# Patient Record
Sex: FEMALE | Race: WHITE | Employment: STUDENT | URBAN - METROPOLITAN AREA
[De-identification: names, ages, dates, MRNs, and addresses within clinical notes are randomized per-mention and may not be internally consistent; named-entity substitution may affect disease eponyms.]

---
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
## 2024-03-09 ENCOUNTER — LAB ENCOUNTER (OUTPATIENT)
Dept: LAB | Age: 19
End: 2024-03-09
Attending: INTERNAL MEDICINE
Payer: COMMERCIAL

## 2024-03-09 DIAGNOSIS — R00.2 PALPITATIONS: ICD-10-CM

## 2024-03-09 DIAGNOSIS — R71.8 ELEVATED RED BLOOD CELL COUNT: ICD-10-CM

## 2024-03-09 DIAGNOSIS — R07.9 CHEST PAIN, UNSPECIFIED: Primary | ICD-10-CM

## 2024-03-09 LAB
ALBUMIN SERPL-MCNC: 4.3 G/DL (ref 3.4–5)
ALBUMIN/GLOB SERPL: 1.3 {RATIO} (ref 1–2)
ALP LIVER SERPL-CCNC: 53 U/L
ALT SERPL-CCNC: 19 U/L
ANION GAP SERPL CALC-SCNC: 6 MMOL/L (ref 0–18)
AST SERPL-CCNC: 17 U/L (ref 15–37)
BASOPHILS # BLD AUTO: 0.12 X10(3) UL (ref 0–0.2)
BASOPHILS NFR BLD AUTO: 1.4 %
BILIRUB SERPL-MCNC: 0.6 MG/DL (ref 0.1–2)
BUN BLD-MCNC: 14 MG/DL (ref 9–23)
CALCIUM BLD-MCNC: 9.6 MG/DL (ref 8.5–10.1)
CHLORIDE SERPL-SCNC: 106 MMOL/L (ref 98–112)
CHOLEST SERPL-MCNC: 165 MG/DL (ref ?–200)
CO2 SERPL-SCNC: 28 MMOL/L (ref 21–32)
CREAT BLD-MCNC: 0.86 MG/DL
CRP SERPL HS-MCNC: <0.16 MG/L (ref ?–3)
EGFRCR SERPLBLD CKD-EPI 2021: 100 ML/MIN/1.73M2 (ref 60–?)
EOSINOPHIL # BLD AUTO: 0.48 X10(3) UL (ref 0–0.7)
EOSINOPHIL NFR BLD AUTO: 5.8 %
ERYTHROCYTE [DISTWIDTH] IN BLOOD BY AUTOMATED COUNT: 12.7 %
ERYTHROCYTE [SEDIMENTATION RATE] IN BLOOD: 3 MM/HR
FASTING PATIENT LIPID ANSWER: YES
FASTING STATUS PATIENT QL REPORTED: YES
GLOBULIN PLAS-MCNC: 3.3 G/DL (ref 2.8–4.4)
GLUCOSE BLD-MCNC: 83 MG/DL (ref 70–99)
HCT VFR BLD AUTO: 44.6 %
HDLC SERPL-MCNC: 96 MG/DL (ref 40–59)
HGB BLD-MCNC: 14.9 G/DL
IMM GRANULOCYTES # BLD AUTO: 0.01 X10(3) UL (ref 0–1)
IMM GRANULOCYTES NFR BLD: 0.1 %
LDLC SERPL CALC-MCNC: 59 MG/DL (ref ?–100)
LYMPHOCYTES # BLD AUTO: 1.86 X10(3) UL (ref 1.5–5)
LYMPHOCYTES NFR BLD AUTO: 22.5 %
MAGNESIUM SERPL-MCNC: 2.2 MG/DL (ref 1.6–2.6)
MCH RBC QN AUTO: 29 PG (ref 26–34)
MCHC RBC AUTO-ENTMCNC: 33.4 G/DL (ref 31–37)
MCV RBC AUTO: 86.9 FL
MONOCYTES # BLD AUTO: 0.71 X10(3) UL (ref 0.1–1)
MONOCYTES NFR BLD AUTO: 8.6 %
NEUTROPHILS # BLD AUTO: 5.1 X10 (3) UL (ref 1.5–7.7)
NEUTROPHILS # BLD AUTO: 5.1 X10(3) UL (ref 1.5–7.7)
NEUTROPHILS NFR BLD AUTO: 61.6 %
NONHDLC SERPL-MCNC: 69 MG/DL (ref ?–130)
OSMOLALITY SERPL CALC.SUM OF ELEC: 290 MOSM/KG (ref 275–295)
PLATELET # BLD AUTO: 266 10(3)UL (ref 150–450)
POTASSIUM SERPL-SCNC: 4 MMOL/L (ref 3.5–5.1)
PROT SERPL-MCNC: 7.6 G/DL (ref 6.4–8.2)
RBC # BLD AUTO: 5.13 X10(6)UL
SODIUM SERPL-SCNC: 140 MMOL/L (ref 136–145)
T4 FREE SERPL-MCNC: 1 NG/DL (ref 0.9–1.6)
TRIGL SERPL-MCNC: 46 MG/DL (ref 30–149)
TSI SER-ACNC: 0.73 MIU/ML (ref 0.36–3.74)
VIT D+METAB SERPL-MCNC: 27.1 NG/ML (ref 30–100)
VLDLC SERPL CALC-MCNC: 7 MG/DL (ref 0–30)
WBC # BLD AUTO: 8.3 X10(3) UL (ref 4–11)

## 2024-03-09 PROCEDURE — 83735 ASSAY OF MAGNESIUM: CPT

## 2024-03-09 PROCEDURE — 84439 ASSAY OF FREE THYROXINE: CPT

## 2024-03-09 PROCEDURE — 85025 COMPLETE CBC W/AUTO DIFF WBC: CPT

## 2024-03-09 PROCEDURE — 36415 COLL VENOUS BLD VENIPUNCTURE: CPT

## 2024-03-09 PROCEDURE — 85652 RBC SED RATE AUTOMATED: CPT

## 2024-03-09 PROCEDURE — 86141 C-REACTIVE PROTEIN HS: CPT

## 2024-03-09 PROCEDURE — 82306 VITAMIN D 25 HYDROXY: CPT

## 2024-03-09 PROCEDURE — 80061 LIPID PANEL: CPT

## 2024-03-09 PROCEDURE — 84443 ASSAY THYROID STIM HORMONE: CPT

## 2024-03-09 PROCEDURE — 80053 COMPREHEN METABOLIC PANEL: CPT

## 2024-04-29 ENCOUNTER — TELEPHONE (OUTPATIENT)
Dept: FAMILY MEDICINE CLINIC | Facility: CLINIC | Age: 19
End: 2024-04-29

## 2024-04-29 ENCOUNTER — OFFICE VISIT (OUTPATIENT)
Dept: FAMILY MEDICINE CLINIC | Facility: CLINIC | Age: 19
End: 2024-04-29
Payer: COMMERCIAL

## 2024-04-29 ENCOUNTER — HOSPITAL ENCOUNTER (OUTPATIENT)
Dept: CV DIAGNOSTICS | Facility: HOSPITAL | Age: 19
End: 2024-04-29
Attending: INTERNAL MEDICINE
Payer: COMMERCIAL

## 2024-04-29 ENCOUNTER — LAB ENCOUNTER (OUTPATIENT)
Dept: LAB | Age: 19
End: 2024-04-29
Attending: NURSE PRACTITIONER
Payer: COMMERCIAL

## 2024-04-29 ENCOUNTER — HOSPITAL ENCOUNTER (OUTPATIENT)
Dept: CV DIAGNOSTICS | Facility: HOSPITAL | Age: 19
Discharge: HOME | End: 2024-04-29
Attending: INTERNAL MEDICINE
Payer: COMMERCIAL

## 2024-04-29 VITALS
DIASTOLIC BLOOD PRESSURE: 64 MMHG | WEIGHT: 146.81 LBS | BODY MASS INDEX: 21.26 KG/M2 | SYSTOLIC BLOOD PRESSURE: 102 MMHG | OXYGEN SATURATION: 98 % | HEART RATE: 64 BPM | RESPIRATION RATE: 18 BRPM | TEMPERATURE: 98 F | HEIGHT: 69.5 IN

## 2024-04-29 DIAGNOSIS — R00.1 SINUS BRADYCARDIA: ICD-10-CM

## 2024-04-29 DIAGNOSIS — N92.6 IRREGULAR MENSES: ICD-10-CM

## 2024-04-29 DIAGNOSIS — R00.2 PALPITATIONS: ICD-10-CM

## 2024-04-29 DIAGNOSIS — R07.89 ATYPICAL CHEST PAIN: ICD-10-CM

## 2024-04-29 DIAGNOSIS — R53.83 OTHER FATIGUE: ICD-10-CM

## 2024-04-29 DIAGNOSIS — Z00.00 GENERAL MEDICAL EXAM: Primary | ICD-10-CM

## 2024-04-29 DIAGNOSIS — Z00.00 GENERAL MEDICAL EXAM: ICD-10-CM

## 2024-04-29 DIAGNOSIS — K30 INDIGESTION: ICD-10-CM

## 2024-04-29 DIAGNOSIS — R07.9 CHEST PAIN, UNSPECIFIED: ICD-10-CM

## 2024-04-29 PROBLEM — M84.362A STRESS FRACTURE OF LEFT TIBIA: Status: RESOLVED | Noted: 2022-03-09 | Resolved: 2024-04-29

## 2024-04-29 LAB
ALBUMIN SERPL-MCNC: 4.2 G/DL (ref 3.4–5)
ALBUMIN/GLOB SERPL: 1.2 {RATIO} (ref 1–2)
ALP LIVER SERPL-CCNC: 58 U/L
ALT SERPL-CCNC: 22 U/L
ANION GAP SERPL CALC-SCNC: 3 MMOL/L (ref 0–18)
AST SERPL-CCNC: 18 U/L (ref 15–37)
BASOPHILS # BLD AUTO: 0.13 X10(3) UL (ref 0–0.2)
BASOPHILS NFR BLD AUTO: 1.8 %
BILIRUB SERPL-MCNC: 0.6 MG/DL (ref 0.1–2)
BUN BLD-MCNC: 13 MG/DL (ref 9–23)
CALCIUM BLD-MCNC: 9.7 MG/DL (ref 8.5–10.1)
CHLORIDE SERPL-SCNC: 106 MMOL/L (ref 98–112)
CHOLEST SERPL-MCNC: 162 MG/DL (ref ?–200)
CO2 SERPL-SCNC: 29 MMOL/L (ref 21–32)
CREAT BLD-MCNC: 1.02 MG/DL
EGFRCR SERPLBLD CKD-EPI 2021: 81 ML/MIN/1.73M2 (ref 60–?)
EOSINOPHIL # BLD AUTO: 0.68 X10(3) UL (ref 0–0.7)
EOSINOPHIL NFR BLD AUTO: 9.3 %
ERYTHROCYTE [DISTWIDTH] IN BLOOD BY AUTOMATED COUNT: 12.5 %
FASTING PATIENT LIPID ANSWER: NO
FASTING STATUS PATIENT QL REPORTED: NO
GLOBULIN PLAS-MCNC: 3.6 G/DL (ref 2.8–4.4)
GLUCOSE BLD-MCNC: 85 MG/DL (ref 70–99)
HCT VFR BLD AUTO: 43.7 %
HDLC SERPL-MCNC: 95 MG/DL (ref 40–59)
HGB BLD-MCNC: 15 G/DL
IMM GRANULOCYTES # BLD AUTO: 0.01 X10(3) UL (ref 0–1)
IMM GRANULOCYTES NFR BLD: 0.1 %
LDLC SERPL CALC-MCNC: 52 MG/DL (ref ?–100)
LYMPHOCYTES # BLD AUTO: 1.73 X10(3) UL (ref 1.5–5)
LYMPHOCYTES NFR BLD AUTO: 23.7 %
MCH RBC QN AUTO: 28.8 PG (ref 26–34)
MCHC RBC AUTO-ENTMCNC: 34.3 G/DL (ref 31–37)
MCV RBC AUTO: 84 FL
MONOCYTES # BLD AUTO: 0.73 X10(3) UL (ref 0.1–1)
MONOCYTES NFR BLD AUTO: 10 %
NEUTROPHILS # BLD AUTO: 4.02 X10 (3) UL (ref 1.5–7.7)
NEUTROPHILS # BLD AUTO: 4.02 X10(3) UL (ref 1.5–7.7)
NEUTROPHILS NFR BLD AUTO: 55.1 %
NONHDLC SERPL-MCNC: 67 MG/DL (ref ?–130)
OSMOLALITY SERPL CALC.SUM OF ELEC: 285 MOSM/KG (ref 275–295)
PLATELET # BLD AUTO: 313 10(3)UL (ref 150–450)
POTASSIUM SERPL-SCNC: 4.2 MMOL/L (ref 3.5–5.1)
PROT SERPL-MCNC: 7.8 G/DL (ref 6.4–8.2)
RBC # BLD AUTO: 5.2 X10(6)UL
SODIUM SERPL-SCNC: 138 MMOL/L (ref 136–145)
TRIGL SERPL-MCNC: 82 MG/DL (ref 30–149)
TSI SER-ACNC: 1.12 MIU/ML (ref 0.36–3.74)
VIT D+METAB SERPL-MCNC: 21.5 NG/ML (ref 30–100)
VLDLC SERPL CALC-MCNC: 12 MG/DL (ref 0–30)
WBC # BLD AUTO: 7.3 X10(3) UL (ref 4–11)

## 2024-04-29 PROCEDURE — 80061 LIPID PANEL: CPT | Performed by: NURSE PRACTITIONER

## 2024-04-29 PROCEDURE — 80050 GENERAL HEALTH PANEL: CPT | Performed by: NURSE PRACTITIONER

## 2024-04-29 PROCEDURE — 93306 TTE W/DOPPLER COMPLETE: CPT | Performed by: INTERNAL MEDICINE

## 2024-04-29 PROCEDURE — 82306 VITAMIN D 25 HYDROXY: CPT | Performed by: NURSE PRACTITIONER

## 2024-04-29 RX ORDER — METOPROLOL SUCCINATE 25 MG/1
TABLET, EXTENDED RELEASE ORAL
COMMUNITY
Start: 2023-03-09 | End: 2024-04-29

## 2024-04-29 NOTE — PROGRESS NOTES
HPI:   Patient is here for physical.    Concerns:   Periods are irregular  Indigestion for a couple of weeks. Happens every times she eats. Has not started food diary. Tried Tums but no relief    LMP: 4/24/24  Menstrual Cycle Length: irregular in the past (3 periods in October, then skipped in November), normal periods  PMS: none  Contraception not sexually active. Declines STD screening    Exercise: running, some strength training  Diet: could be better, especially at college    Wt Readings from Last 6 Encounters:   04/29/24 146 lb 12.8 oz (66.6 kg) (77%, Z= 0.74)*   03/09/23 147 lb (66.7 kg) (80%, Z= 0.86)*   12/20/21 140 lb (63.5 kg) (77%, Z= 0.74)*   11/28/19 134 lb (60.8 kg) (77%, Z= 0.73)*     * Growth percentiles are based on CDC (Girls, 2-20 Years) data.     Body mass index is 21.37 kg/m².     Cholesterol, Total (mg/dL)   Date Value   03/09/2024 165     HDL Cholesterol (mg/dL)   Date Value   03/09/2024 96 (H)     LDL Cholesterol (mg/dL)   Date Value   03/09/2024 59     AST (U/L)   Date Value   03/09/2024 17   03/09/2023 33     ALT (U/L)   Date Value   03/09/2024 19   03/09/2023 28        Current Outpatient Medications   Medication Sig Dispense Refill    Multiple Vitamin (MULTI-VITAMIN DAILY) Oral Tab Take by mouth daily.      MAGNESIUM SULFATE OR daily.        History reviewed. No pertinent past medical history.   History reviewed. No pertinent surgical history.   History reviewed. No pertinent family history.   Social History:   Social History     Socioeconomic History    Marital status: Single   Tobacco Use    Smoking status: Never    Smokeless tobacco: Never   Vaping Use    Vaping status: Never Used     Social Determinants of Health      [facility]    Housing Stability        REVIEW OF SYSTEMS:   Review of Systems   Constitutional:  Positive for fatigue. Negative for chills, fever and unexpected weight change.   HENT:  Negative for  congestion, ear pain, postnasal drip, rhinorrhea, sore throat and trouble swallowing.    Eyes:  Negative for visual disturbance.   Respiratory:  Negative for cough and shortness of breath.    Cardiovascular:  Positive for chest pain (seeing cardiology, having stress test todya). Negative for palpitations.   Gastrointestinal:  Negative for abdominal pain, blood in stool, constipation, diarrhea, nausea and vomiting.        + indigestion   Endocrine: Negative for cold intolerance, heat intolerance, polydipsia, polyphagia and polyuria.   Genitourinary:  Negative for dysuria, frequency, hematuria and pelvic pain.   Musculoskeletal:  Negative for back pain.   Skin:  Negative for rash.   Neurological:  Negative for headaches.   Hematological:  Does not bruise/bleed easily.   Psychiatric/Behavioral:  Negative for dysphoric mood and sleep disturbance. The patient is not nervous/anxious.        EXAM:   /64 (BP Location: Left arm, Patient Position: Sitting, Cuff Size: adult)   Pulse 64   Temp 97.6 °F (36.4 °C)   Resp 18   Ht 5' 9.5\" (1.765 m)   Wt 146 lb 12.8 oz (66.6 kg)   LMP 04/24/2024 (Exact Date)   SpO2 98%   BMI 21.37 kg/m²   Ideal body weight: 67.4 kg (148 lb 7.7 oz)   Physical Exam  Constitutional:       General: She is not in acute distress.     Appearance: Normal appearance. She is well-developed and well-groomed. She is not ill-appearing.   HENT:      Right Ear: Tympanic membrane, ear canal and external ear normal.      Left Ear: Tympanic membrane, ear canal and external ear normal.      Nose: Nose normal.      Mouth/Throat:      Mouth: Mucous membranes are moist.      Pharynx: Oropharynx is clear.   Eyes:      Conjunctiva/sclera: Conjunctivae normal.      Pupils: Pupils are equal, round, and reactive to light.   Neck:      Thyroid: No thyromegaly.   Cardiovascular:      Rate and Rhythm: Normal rate and regular rhythm.      Heart sounds: Normal heart sounds.   Pulmonary:      Effort: Pulmonary effort  is normal.      Breath sounds: Normal breath sounds.   Abdominal:      General: Abdomen is flat. Bowel sounds are normal.      Palpations: Abdomen is soft.      Tenderness: There is no abdominal tenderness.   Musculoskeletal:         General: Normal range of motion.      Cervical back: Normal range of motion.   Skin:     General: Skin is warm and dry.   Neurological:      General: No focal deficit present.      Mental Status: She is alert and oriented to person, place, and time.      Cranial Nerves: No cranial nerve deficit.   Psychiatric:         Mood and Affect: Mood normal.         ASSESSMENT AND PLAN:   Diagnoses and all orders for this visit:    General medical exam  -     TSH W Reflex To Free T4; Future  -     Comp Metabolic Panel (14); Future  -     Lipid Panel; Future  -     CBC With Differential With Platelet; Future    Indigestion    Irregular menses    Other fatigue  -     Vitamin D [E]; Future    Checking lab work  Seeing cardiology today  Discussed strategies to reduce indigestion. Trial OTC Nexium  Call with sx persisting or worsening

## 2025-03-03 ENCOUNTER — OFFICE VISIT (OUTPATIENT)
Dept: FAMILY MEDICINE CLINIC | Facility: CLINIC | Age: 20
End: 2025-03-03
Payer: COMMERCIAL

## 2025-03-03 VITALS
BODY MASS INDEX: 21.43 KG/M2 | HEIGHT: 69.5 IN | OXYGEN SATURATION: 98 % | SYSTOLIC BLOOD PRESSURE: 112 MMHG | WEIGHT: 148 LBS | TEMPERATURE: 98 F | HEART RATE: 60 BPM | DIASTOLIC BLOOD PRESSURE: 66 MMHG

## 2025-03-03 DIAGNOSIS — E55.9 VITAMIN D DEFICIENCY: ICD-10-CM

## 2025-03-03 DIAGNOSIS — N83.202 LEFT OVARIAN CYST: ICD-10-CM

## 2025-03-03 DIAGNOSIS — L70.0 ACNE VULGARIS: ICD-10-CM

## 2025-03-03 DIAGNOSIS — R53.83 OTHER FATIGUE: ICD-10-CM

## 2025-03-03 DIAGNOSIS — Z30.011 OCP (ORAL CONTRACEPTIVE PILLS) INITIATION: ICD-10-CM

## 2025-03-03 DIAGNOSIS — N92.6 IRREGULAR MENSES: Primary | ICD-10-CM

## 2025-03-03 PROCEDURE — 99213 OFFICE O/P EST LOW 20 MIN: CPT | Performed by: NURSE PRACTITIONER

## 2025-03-03 PROCEDURE — G2211 COMPLEX E/M VISIT ADD ON: HCPCS | Performed by: NURSE PRACTITIONER

## 2025-03-03 PROCEDURE — 3074F SYST BP LT 130 MM HG: CPT | Performed by: NURSE PRACTITIONER

## 2025-03-03 PROCEDURE — 3078F DIAST BP <80 MM HG: CPT | Performed by: NURSE PRACTITIONER

## 2025-03-03 PROCEDURE — 3008F BODY MASS INDEX DOCD: CPT | Performed by: NURSE PRACTITIONER

## 2025-03-03 RX ORDER — SPIRONOLACTONE 25 MG/1
25 TABLET ORAL DAILY
COMMUNITY

## 2025-03-03 RX ORDER — NORETHINDRONE ACETATE AND ETHINYL ESTRADIOL 1MG-20(21)
1 KIT ORAL DAILY
Qty: 84 TABLET | Refills: 0 | Status: SHIPPED | OUTPATIENT
Start: 2025-03-03 | End: 2025-05-26

## 2025-03-03 NOTE — PROGRESS NOTES
HPI:     Patient is here for ER follow up for ovaian cyst. Pain is a little better and only comes and goes versus before where sharp pain was there the entire week and a half.     Periods have always been irregular - sometimes gets period sometimes does not   LMP 2/18/2025 x6days abnomally heavy x2 days no clots   12/8/2024 was the period prior to that x6 days moderate to light flow no clots   Dermatologist also started her on Spironolactone for acne. Prior to starting that, felt like she was getting a monthly     Fatigue- no matter how much sleep she gets she wakes up tired x1 year     Current Outpatient Medications   Medication Sig Dispense Refill    Norethin Ace-Eth Estrad-FE 1-20 MG-MCG Oral Tab Take 1 tablet by mouth daily. 84 tablet 0    spironolactone 25 MG Oral Tab Take 1 tablet (25 mg total) by mouth daily.      MAGNESIUM SULFATE OR daily.        History reviewed. No pertinent past medical history.   History reviewed. No pertinent surgical history.   History reviewed. No pertinent family history.   Social History     Socioeconomic History    Marital status: Single   Tobacco Use    Smoking status: Never    Smokeless tobacco: Never   Vaping Use    Vaping status: Never Used     Social Drivers of Health      Received from [facility], [facility]    Housing Stability         REVIEW OF SYSTEMS:   Review of Systems   Constitutional:  Positive for fatigue.   Respiratory: Negative.     Cardiovascular: Negative.    Genitourinary:  Positive for menstrual problem.        Irregular periods      Musculoskeletal:         Left sided pain on and off         EXAM:   /66   Pulse 60   Temp 97.8 °F (36.6 °C)   Ht 5' 9.5\" (1.765 m)   Wt 148 lb (67.1 kg)   LMP 02/18/2025 (Exact Date)   SpO2 98%   BMI 21.54 kg/m²   Physical Exam  Constitutional:       Appearance: Normal appearance. She is normal weight.   Cardiovascular:      Rate and Rhythm: Normal rate and regular rhythm.       Pulses: Normal pulses.      Heart sounds: Normal heart sounds.   Pulmonary:      Effort: Pulmonary effort is normal.      Breath sounds: Normal breath sounds.   Neurological:      Mental Status: She is alert.         ASSESSMENT AND PLAN:   Diagnoses and all orders for this visit:    Irregular menses  Comments:  will start OCP and RTC in 3 months. Use period tracker  Orders:  -     Hemoglobin A1C [E]; Future  -     TSH and Free T4 [E]; Future  -     FSH; Future  -     LH (Luteinizing Hormone); Future  -     Norethin Ace-Eth Estrad-FE 1-20 MG-MCG Oral Tab; Take 1 tablet by mouth daily.    Left ovarian cyst  Comments:  pain is lessening, plan to recheck u/s in 3 months to assess for resolution  Orders:  -     US PELVIS (TRANSABDOMINAL AND TRANSVAGINAL) (CPT=76856/47916); Future    OCP (oral contraceptive pills) initiation  Comments:  Has never been sexually active, no fam hx of blood clots/bleeding disorders. Start OCP 4 weeks after LMP  Orders:  -     Norethin Ace-Eth Estrad-FE 1-20 MG-MCG Oral Tab; Take 1 tablet by mouth daily.    Other fatigue  Comments:  check additional lab work today  Orders:  -     Hemoglobin A1C [E]; Future  -     TSH and Free T4 [E]; Future    Vitamin D deficiency  -     Vitamin D [E]; Future    Acne vulgaris    Patient denies any family history of clotting disorders/blood clots.     Patient discussed with student. Independent exam performed. Agree with assessment and plan.

## 2025-04-02 ENCOUNTER — TELEPHONE (OUTPATIENT)
Dept: FAMILY MEDICINE CLINIC | Facility: CLINIC | Age: 20
End: 2025-04-02

## 2025-04-02 NOTE — TELEPHONE ENCOUNTER
Patient due for labs and US  MCM sent to patient  Future Appointments   Date Time Provider Department Center   5/5/2025  9:00 AM OS US RM1 [facility] [place]   5/5/2025 10:00 AM Anna Marie Horne APRN [facility] [place]

## 2025-05-05 ENCOUNTER — LAB ENCOUNTER (OUTPATIENT)
Dept: LAB | Age: 20
End: 2025-05-05
Attending: NURSE PRACTITIONER
Payer: COMMERCIAL

## 2025-05-05 ENCOUNTER — HOSPITAL ENCOUNTER (OUTPATIENT)
Dept: ULTRASOUND IMAGING | Age: 20
Discharge: HOME | End: 2025-05-05
Attending: NURSE PRACTITIONER
Payer: COMMERCIAL

## 2025-05-05 ENCOUNTER — OFFICE VISIT (OUTPATIENT)
Dept: FAMILY MEDICINE CLINIC | Facility: CLINIC | Age: 20
End: 2025-05-05
Payer: COMMERCIAL

## 2025-05-05 VITALS
HEIGHT: 69.5 IN | BODY MASS INDEX: 21.86 KG/M2 | OXYGEN SATURATION: 99 % | WEIGHT: 151 LBS | HEART RATE: 81 BPM | DIASTOLIC BLOOD PRESSURE: 68 MMHG | SYSTOLIC BLOOD PRESSURE: 112 MMHG | TEMPERATURE: 97 F

## 2025-05-05 DIAGNOSIS — E55.9 VITAMIN D DEFICIENCY: ICD-10-CM

## 2025-05-05 DIAGNOSIS — N83.202 LEFT OVARIAN CYST: ICD-10-CM

## 2025-05-05 DIAGNOSIS — R53.83 OTHER FATIGUE: ICD-10-CM

## 2025-05-05 DIAGNOSIS — Z00.00 GENERAL MEDICAL EXAM: Primary | ICD-10-CM

## 2025-05-05 DIAGNOSIS — K06.8 GUMS, BLEEDING: ICD-10-CM

## 2025-05-05 DIAGNOSIS — G47.09 OTHER INSOMNIA: ICD-10-CM

## 2025-05-05 DIAGNOSIS — N92.6 IRREGULAR MENSES: ICD-10-CM

## 2025-05-05 DIAGNOSIS — Z30.41 ORAL CONTRACEPTIVE PILL SURVEILLANCE: ICD-10-CM

## 2025-05-05 DIAGNOSIS — Z23 NEED FOR VACCINATION: ICD-10-CM

## 2025-05-05 LAB
ALBUMIN SERPL-MCNC: 5.1 G/DL (ref 3.2–4.8)
ALBUMIN/GLOB SERPL: 1.9 {RATIO} (ref 1–2)
ALP LIVER SERPL-CCNC: 44 U/L (ref 52–144)
ALT SERPL-CCNC: 18 U/L (ref 10–49)
ANION GAP SERPL CALC-SCNC: 7 MMOL/L (ref 0–18)
AST SERPL-CCNC: 27 U/L (ref ?–34)
BILIRUB SERPL-MCNC: 0.4 MG/DL (ref 0.3–1.2)
BUN BLD-MCNC: 18 MG/DL (ref 9–23)
CALCIUM BLD-MCNC: 9.8 MG/DL (ref 8.7–10.6)
CHLORIDE SERPL-SCNC: 103 MMOL/L (ref 98–112)
CO2 SERPL-SCNC: 28 MMOL/L (ref 21–32)
CREAT BLD-MCNC: 1.05 MG/DL (ref 0.55–1.02)
EGFRCR SERPLBLD CKD-EPI 2021: 78 ML/MIN/1.73M2 (ref 60–?)
EST. AVERAGE GLUCOSE BLD GHB EST-MCNC: 105 MG/DL (ref 68–126)
FASTING STATUS PATIENT QL REPORTED: NO
GLOBULIN PLAS-MCNC: 2.7 G/DL (ref 2–3.5)
GLUCOSE BLD-MCNC: 107 MG/DL (ref 70–99)
HBA1C MFR BLD: 5.3 % (ref ?–5.7)
OSMOLALITY SERPL CALC.SUM OF ELEC: 288 MOSM/KG (ref 275–295)
POTASSIUM SERPL-SCNC: 4.2 MMOL/L (ref 3.5–5.1)
PROT SERPL-MCNC: 7.8 G/DL (ref 5.7–8.2)
SODIUM SERPL-SCNC: 138 MMOL/L (ref 136–145)
T4 FREE SERPL-MCNC: 1.4 NG/DL (ref 0.8–1.7)
TSI SER-ACNC: 0.54 UIU/ML (ref 0.55–4.78)
VIT D+METAB SERPL-MCNC: 23.8 NG/ML (ref 30–100)

## 2025-05-05 PROCEDURE — 82785 ASSAY OF IGE: CPT | Performed by: NURSE PRACTITIONER

## 2025-05-05 PROCEDURE — 80053 COMPREHEN METABOLIC PANEL: CPT | Performed by: NURSE PRACTITIONER

## 2025-05-05 PROCEDURE — 84439 ASSAY OF FREE THYROXINE: CPT | Performed by: NURSE PRACTITIONER

## 2025-05-05 PROCEDURE — 76830 TRANSVAGINAL US NON-OB: CPT | Performed by: NURSE PRACTITIONER

## 2025-05-05 PROCEDURE — 84443 ASSAY THYROID STIM HORMONE: CPT | Performed by: NURSE PRACTITIONER

## 2025-05-05 PROCEDURE — 83036 HEMOGLOBIN GLYCOSYLATED A1C: CPT | Performed by: NURSE PRACTITIONER

## 2025-05-05 PROCEDURE — 82306 VITAMIN D 25 HYDROXY: CPT | Performed by: NURSE PRACTITIONER

## 2025-05-05 PROCEDURE — 76856 US EXAM PELVIC COMPLETE: CPT | Performed by: NURSE PRACTITIONER

## 2025-05-05 PROCEDURE — 86003 ALLG SPEC IGE CRUDE XTRC EA: CPT | Performed by: NURSE PRACTITIONER

## 2025-05-05 RX ORDER — NORETHINDRONE ACETATE AND ETHINYL ESTRADIOL 1MG-20(21)
1 KIT ORAL DAILY
Qty: 84 TABLET | Refills: 0 | Status: SHIPPED | OUTPATIENT
Start: 2025-05-05 | End: 2025-07-28

## 2025-05-05 RX ORDER — MINOCYCLINE 15 MG/G
AEROSOL, FOAM TOPICAL
COMMUNITY
Start: 2025-03-04

## 2025-05-05 RX ORDER — DOXYCYCLINE HYCLATE 50 MG/1
TABLET, FILM COATED ORAL
COMMUNITY
Start: 2025-04-03

## 2025-05-05 NOTE — PROGRESS NOTES
HPI:   Patient is here for physical.    Concerns: following up for ovarian cyst and starting OCP    LMP: 4/22/2025  Menstrual Cycle Length: irregular since starting oral contraceptive pill in 3/2025, flow is overall light  PMS: fatigue  Sexual Activity: not sexually active    Wt Readings from Last 6 Encounters:   05/05/25 151 lb (68.5 kg)   03/03/25 148 lb (67.1 kg)   04/29/24 146 lb 12.8 oz (66.6 kg) (77%, Z= 0.74)*   03/09/23 147 lb (66.7 kg) (80%, Z= 0.86)*   12/20/21 140 lb (63.5 kg) (77%, Z= 0.74)*   11/28/19 134 lb (60.8 kg) (77%, Z= 0.73)*     * Growth percentiles are based on CDC (Girls, 2-20 Years) data.     Body mass index is 21.98 kg/m².     Cholesterol, Total (mg/dL)   Date Value   04/29/2024 162   03/09/2024 165     HDL Cholesterol (mg/dL)   Date Value   04/29/2024 95 (H)   03/09/2024 96 (H)     LDL Cholesterol (mg/dL)   Date Value   04/29/2024 52   03/09/2024 59     AST (U/L)   Date Value   04/29/2024 18   03/09/2024 17   03/09/2023 33     ALT (U/L)   Date Value   04/29/2024 22   03/09/2024 19   03/09/2023 28        Current Medications[1]   Past Medical History[2]   Past Surgical History[3]   Family History[4]   Social History:   Short Social Hx on File[5]     REVIEW OF SYSTEMS:   Review of Systems   Constitutional:  Positive for fatigue. Negative for chills, fever and unexpected weight change.   HENT:  Negative for congestion, ear pain, postnasal drip, rhinorrhea, sore throat and trouble swallowing.         Noticed some gum bleeding/lip swelling with breakfast recently. It resolved about 2 weeks ago. Unsure if it was related to one her meds or not   Eyes:  Negative for visual disturbance.   Respiratory:  Negative for cough and shortness of breath.    Cardiovascular:  Negative for chest pain and palpitations.   Gastrointestinal:  Negative for abdominal pain, blood in stool, constipation, diarrhea, nausea and vomiting.   Endocrine: Negative for cold intolerance, heat intolerance, polydipsia, polyphagia  and polyuria.        Night sweats   Genitourinary:  Negative for dysuria, frequency, hematuria and pelvic pain.   Musculoskeletal:  Negative for back pain.   Skin:  Negative for rash.   Neurological:  Negative for headaches.   Hematological:  Does not bruise/bleed easily.   Psychiatric/Behavioral:  Positive for sleep disturbance (doesn't feel like she is sleeping enough). Negative for dysphoric mood. The patient is not nervous/anxious.        EXAM:   /68   Pulse 81   Temp 97.1 °F (36.2 °C)   Ht 5' 9.5\" (1.765 m)   Wt 151 lb (68.5 kg)   LMP 04/22/2025 (Exact Date)   SpO2 99%   BMI 21.98 kg/m²   Ideal body weight: 67.4 kg (148 lb 7.7 oz)  Adjusted ideal body weight: 67.8 kg (149 lb 7.8 oz)   Physical Exam  Constitutional:       General: She is not in acute distress.     Appearance: She is well-developed, well-groomed and normal weight. She is not ill-appearing.   HENT:      Right Ear: Tympanic membrane, ear canal and external ear normal.      Left Ear: Tympanic membrane, ear canal and external ear normal.      Nose: Nose normal.      Mouth/Throat:      Mouth: Mucous membranes are moist.      Pharynx: Oropharynx is clear.   Eyes:      Conjunctiva/sclera: Conjunctivae normal.      Pupils: Pupils are equal, round, and reactive to light.   Neck:      Thyroid: No thyromegaly.   Cardiovascular:      Rate and Rhythm: Normal rate and regular rhythm.      Heart sounds: Normal heart sounds.   Pulmonary:      Effort: Pulmonary effort is normal.      Breath sounds: Normal breath sounds.   Abdominal:      General: Abdomen is flat. Bowel sounds are normal.      Palpations: Abdomen is soft.      Tenderness: There is no abdominal tenderness.   Musculoskeletal:         General: Normal range of motion.      Cervical back: Normal range of motion.   Skin:     General: Skin is warm and dry.   Neurological:      General: No focal deficit present.      Mental Status: She is alert and oriented to person, place, and time.       Cranial Nerves: No cranial nerve deficit.   Psychiatric:         Mood and Affect: Mood normal.         ASSESSMENT AND PLAN:   Diagnoses and all orders for this visit:    General medical exam    Need for vaccination  -     TdaP (Adacel, Boostrix) [57855]    Left ovarian cyst  Comments:  reviewed u/s today, resolved    Irregular menses  Comments:  started OCPs  Orders:  -     Norethin Ace-Eth Estrad-FE 1-20 MG-MCG Oral Tab; Take 1 tablet by mouth daily.    Oral contraceptive pill surveillance  Comments:  menses have been irregular but just started OCP. Will get it another 1-2 months to adjust & send me update  Orders:  -     Norethin Ace-Eth Estrad-FE 1-20 MG-MCG Oral Tab; Take 1 tablet by mouth daily.    Other insomnia  Comments:  check lab work today  Orders:  -     Comp Metabolic Panel (14) [E]; Future    Gums, bleeding  -     Adult Food Allergy Prof [E]; Future      Note to patient: The 21st Century Cures Act makes medical notes like these available to patients in the interest of transparency. However, be advised this is a medical document. It is intended as peer to peer communication. It is written in medical language and may contain abbreviations or verbiage that are unfamiliar. It may appear blunt or direct. Medical documents are intended to carry relevant information, facts as evident, and the clinical opinion of the practitioner.             [1]   Current Outpatient Medications   Medication Sig Dispense Refill    Doxycycline Hyclate 50 MG Oral Tab       ZILXI 1.5 % External Foam       Norethin Ace-Eth Estrad-FE 1-20 MG-MCG Oral Tab Take 1 tablet by mouth daily. 84 tablet 0    spironolactone 25 MG Oral Tab Take 1 tablet (25 mg total) by mouth daily.      MAGNESIUM SULFATE OR daily.     [2] History reviewed. No pertinent past medical history.  [3] History reviewed. No pertinent surgical history.  [4] History reviewed. No pertinent family history.  [5]   Social History  Socioeconomic History    Marital status:  Single   Tobacco Use    Smoking status: Never    Smokeless tobacco: Never   Vaping Use    Vaping status: Never Used     Social Drivers of Health      Received from [facility]    Housing Stability

## 2025-05-06 ENCOUNTER — PATIENT MESSAGE (OUTPATIENT)
Dept: FAMILY MEDICINE CLINIC | Facility: CLINIC | Age: 20
End: 2025-05-06

## 2025-05-06 DIAGNOSIS — R79.89 LOW TSH LEVEL: Primary | ICD-10-CM

## 2025-05-06 NOTE — TELEPHONE ENCOUNTER
----- Message from Anna Marie Horne sent at 5/6/2025  2:39 PM CDT -----  Results reviewed.   Vitamin D remains mildly low. Will send prescription  TSH is mildly low. Recheck in 1 month for comparison  Chemistries stable, she was not fasting so not concerned about mildly elevated blood sugar   No diabetes or prediabetes    ----- Message -----  From: Lab, Background User  Sent: 5/5/2025   5:34 PM CDT  To: HUYEN Chavez

## 2025-05-08 LAB
ALLERGEN BRAZIL NUT: 0.17 KUA/L (ref ?–0.1)
ALMOND IGE QN: 0.41 KUA/L (ref ?–0.1)
CASHEW NUT IGE QN: 0.22 KUA/L (ref ?–0.1)
CLAM IGE QN: <0.1 KUA/L (ref ?–0.1)
CODFISH IGE QN: <0.1 KUA/L (ref ?–0.1)
CORN IGE QN: 1.09 KUA/L (ref ?–0.1)
COW MILK IGE QN: 0.6 KUA/L (ref ?–0.1)
EGG WHITE IGE QN: 1.04 KUA/L (ref ?–0.1)
GLUTEN IGE QN: 1.18 KUA/L (ref ?–0.1)
HAZELNUT IGE QN: 0.56 KUA/L (ref ?–0.1)
IGE SERPL-ACNC: 196 KU/L (ref 2–214)
PEANUT IGE QN: 1.1 KUA/L (ref ?–0.1)
SALMON IGE QN: <0.1 KUA/L (ref ?–0.1)
SCALLOP IGE QN: <0.1 KUA/L (ref ?–0.1)
SESAME SEED IGE QN: 1.71 KUA/L (ref ?–0.1)
SHRIMP IGE QN: 0.26 KUA/L (ref ?–0.1)
SOYBEAN IGE QN: 0.41 KUA/L (ref ?–0.1)
WALNUT IGE QN: 0.34 KUA/L (ref ?–0.1)
WHEAT IGE QN: 1.73 KUA/L (ref ?–0.1)

## 2025-05-21 ENCOUNTER — TELEPHONE (OUTPATIENT)
Dept: FAMILY MEDICINE CLINIC | Facility: CLINIC | Age: 20
End: 2025-05-21

## 2025-05-21 ENCOUNTER — HOSPITAL ENCOUNTER (OUTPATIENT)
Age: 20
Discharge: HOME | End: 2025-05-21
Payer: COMMERCIAL

## 2025-05-21 ENCOUNTER — APPOINTMENT (OUTPATIENT)
Dept: CT IMAGING | Age: 20
End: 2025-05-21
Attending: PHYSICIAN ASSISTANT
Payer: COMMERCIAL

## 2025-05-21 VITALS
DIASTOLIC BLOOD PRESSURE: 77 MMHG | RESPIRATION RATE: 18 BRPM | HEART RATE: 88 BPM | OXYGEN SATURATION: 99 % | BODY MASS INDEX: 21.48 KG/M2 | SYSTOLIC BLOOD PRESSURE: 113 MMHG | TEMPERATURE: 98 F | HEIGHT: 69 IN | WEIGHT: 145 LBS

## 2025-05-21 DIAGNOSIS — N83.201 RIGHT OVARIAN CYST: ICD-10-CM

## 2025-05-21 DIAGNOSIS — R10.31 ABDOMINAL PAIN, RIGHT LOWER QUADRANT: Primary | ICD-10-CM

## 2025-05-21 LAB
#MXD IC: 1.2 X10ˆ3/UL (ref 0.1–1)
B-HCG UR QL: NEGATIVE
BILIRUB UR QL STRIP: NEGATIVE
BUN BLD-MCNC: 15 MG/DL (ref 7–18)
CHLORIDE BLD-SCNC: 103 MMOL/L (ref 98–112)
CLARITY UR: CLEAR
CO2 BLD-SCNC: 29 MMOL/L (ref 21–32)
COLOR UR: YELLOW
CREAT BLD-MCNC: 1.3 MG/DL (ref 0.55–1.02)
EGFRCR SERPLBLD CKD-EPI 2021: 60 ML/MIN/1.73M2 (ref 60–?)
GLUCOSE BLD-MCNC: 79 MG/DL (ref 70–99)
GLUCOSE UR STRIP-MCNC: NEGATIVE MG/DL
HCT VFR BLD AUTO: 42.6 % (ref 35–48)
HCT VFR BLD CALC: 43 % (ref 34–50)
HGB BLD-MCNC: 14.2 G/DL (ref 12–16)
HGB UR QL STRIP: NEGATIVE
ISTAT IONIZED CALCIUM FOR CHEM 8: 1.22 MMOL/L (ref 1.12–1.32)
KETONES UR STRIP-MCNC: NEGATIVE MG/DL
LEUKOCYTE ESTERASE UR QL STRIP: NEGATIVE
LYMPHOCYTES # BLD AUTO: 1.6 X10ˆ3/UL (ref 1–4)
LYMPHOCYTES NFR BLD AUTO: 15.4 %
MCH RBC QN AUTO: 28.5 PG (ref 26–34)
MCHC RBC AUTO-ENTMCNC: 33.3 G/DL (ref 31–37)
MCV RBC AUTO: 85.4 FL (ref 80–100)
MIXED CELL %: 11.3 %
NEUTROPHILS # BLD AUTO: 7.4 X10ˆ3/UL (ref 1.5–7.7)
NEUTROPHILS NFR BLD AUTO: 73.3 %
NITRITE UR QL STRIP: NEGATIVE
PH UR STRIP: 8 [PH]
PLATELET # BLD AUTO: 318 X10ˆ3/UL (ref 150–450)
POTASSIUM BLD-SCNC: 4 MMOL/L (ref 3.6–5.1)
PROT UR STRIP-MCNC: NEGATIVE MG/DL
RBC # BLD AUTO: 4.99 X10ˆ6/UL (ref 3.8–5.3)
SODIUM BLD-SCNC: 140 MMOL/L (ref 136–145)
SP GR UR STRIP: 1.01
UROBILINOGEN UR STRIP-ACNC: <2 MG/DL
WBC # BLD AUTO: 10.2 X10ˆ3/UL (ref 4–11)

## 2025-05-21 PROCEDURE — 74177 CT ABD & PELVIS W/CONTRAST: CPT | Performed by: PHYSICIAN ASSISTANT

## 2025-05-21 RX ORDER — IOHEXOL 350 MG/ML
80 INJECTION, SOLUTION INTRAVENOUS
Status: COMPLETED | OUTPATIENT
Start: 2025-05-21 | End: 2025-05-21

## 2025-05-21 NOTE — ED PROVIDER NOTES
Patient Seen in: [facility]        History  Chief Complaint   Patient presents with    Abdominal Pain     Stated Complaint: abdominal pain    Subjective:   HPI    19-year-old female presents to immediate care for evaluation of right lower quadrant abdominal pain starting 3 to 4 days ago.  She states pain significantly improves when sitting, worse in any other position or with movement.  She denies nausea, vomiting, diarrhea, urinary symptoms, abnormal vaginal discharge, fever/chills.  Last normal bowel movement yesterday. She further denies abdominal surgical history.  She states she has been taking ibuprofen after dental procedure which has not helped abdominal pain.      Objective:     History reviewed. No pertinent past medical history.           History reviewed. No pertinent surgical history.             Social History     Socioeconomic History    Marital status: Single   Tobacco Use    Smoking status: Never    Smokeless tobacco: Never   Vaping Use    Vaping status: Never Used     Social Drivers of Health      Received from [facility]    Housing Stability              Review of Systems    Positive for stated complaint: abdominal pain  Other systems are as noted in HPI.  Constitutional and vital signs reviewed.      All other systems reviewed and negative except as noted above.                  Physical Exam    ED Triage Vitals [05/21/25 1052]   /64   Pulse 74   Resp 16   Temp 98.3 °F (36.8 °C)   Temp src Oral   SpO2 98 %   O2 Device None (Room air)       Current Vitals:   No data recorded      Physical Exam  Vitals and nursing note reviewed.   Constitutional:       General: She is not in acute distress.     Appearance: Normal appearance. She is not ill-appearing, toxic-appearing or diaphoretic.   Cardiovascular:      Rate and Rhythm: Normal rate.   Pulmonary:      Effort: Pulmonary effort is normal. No respiratory distress.   Abdominal:      Palpations: Abdomen is soft.       Tenderness: There is abdominal tenderness (RLQ). There is no right CVA tenderness, left CVA tenderness or guarding.   Neurological:      Mental Status: She is alert and oriented to person, place, and time.   Psychiatric:         Behavior: Behavior normal.         ED Course  Labs Reviewed   POCT CBC - Abnormal; Notable for the following components:       Result Value    # Mixed Cells 1.2 (*)     All other components within normal limits   POCT ISTAT CHEM8 CARTRIDGE - Abnormal; Notable for the following components:    ISTAT Creatinine 1.30 (*)     All other components within normal limits   POCT PREGNANCY URINE - Normal   [facility] POCT URINALYSIS DIPSTICK     MDM  Differential diagnosis considered but not limited to ovarian cyst, appendicitis, other acute intra-abdominal pathology    Mild tenderness to right lower quadrant. Urine pregnancy negative.  UA unremarkable.  CBC not suggestive. Chemistry with elevated creatinine, 1.30, otherwise unremarkable.  CT abdomen/pelvis with contrast resulted with the following impression:    1. No acute intra-abdominal/pelvic process identified.  No CT evidence of acute appendicitis.      2. Probable functional cyst in the right ovary measuring up to 4.9 cm.      3. Minimal free fluid in the pelvis may be physiologic.        All results discussed with patient.  She plans to continue follow-up with her PCP.  Will return for any new or worsening symptoms.      Medical Decision Making  Amount and/or Complexity of Data Reviewed  Labs: ordered. Decision-making details documented in ED Course.  Radiology: ordered. Decision-making details documented in ED Course.    Risk  OTC drugs.        Disposition and Plan     Clinical Impression:  1. Abdominal pain, right lower quadrant    2. Right ovarian cyst         Disposition:  Discharge  5/21/2025  1:02 pm    Follow-up:  [facility] [address] [id number] [phone]    If symptoms worsen          Medications  Prescribed:  Discharge Medication List as of 5/21/2025  1:13 PM                Supplementary Documentation:

## 2025-05-21 NOTE — TELEPHONE ENCOUNTER
Appointment for: Devang Giraldo ([id number])   Visit type: MYCHART EXAM (2964)   5/22/2025 9:00 AM (20 minutes) with Anna Marie Horne in [facility]      Patient comments:   Abdominal pain       Patient made mychart Appointment. Wanted to have triaged.

## 2025-05-21 NOTE — TELEPHONE ENCOUNTER
Spoke with patient - 3-4 days right lower abd pain \"constant\"  Denies fever  Denies vomiting - has brief nausea but was on antibiotic and ibuprofen due to procedure.  Denies diarrhea.    Instructed to go to [facility] today for evaluation  Patient verbalized understanding    Appt canceled for tomorrow

## 2025-06-06 ENCOUNTER — TELEPHONE (OUTPATIENT)
Dept: FAMILY MEDICINE CLINIC | Facility: CLINIC | Age: 20
End: 2025-06-06

## 2025-06-27 DIAGNOSIS — Z30.41 ORAL CONTRACEPTIVE PILL SURVEILLANCE: ICD-10-CM

## 2025-06-27 DIAGNOSIS — N92.6 IRREGULAR MENSES: ICD-10-CM

## 2025-06-28 RX ORDER — NORETHINDRONE ACETATE AND ETHINYL ESTRADIOL 1MG-20(21)
1 KIT ORAL DAILY
Qty: 84 TABLET | Refills: 0 | Status: SHIPPED | OUTPATIENT
Start: 2025-06-28

## 2025-06-28 NOTE — TELEPHONE ENCOUNTER
Gynecology Medication Protocol Passed 06/27/2025 04:23 PM   Protocol Details Medication is active on med list    Physical or Pelvic/Breast in past 12 or next 3 mos--VIA MANUAL LOOKUP     Request for BLISOVI FE 1/20 1-20 MG-MCG Oral Tab     LOV 5/5/25 with Anna Marie Horne, APRN   Last refill 5/5/25 - 84  tablets 0 refill   No future appointments.    Labs 5/5/25